# Patient Record
Sex: FEMALE | Race: OTHER
[De-identification: names, ages, dates, MRNs, and addresses within clinical notes are randomized per-mention and may not be internally consistent; named-entity substitution may affect disease eponyms.]

---

## 2019-07-29 ENCOUNTER — HOSPITAL ENCOUNTER (INPATIENT)
Dept: HOSPITAL 54 - MED | Age: 44
LOS: 3 days | Discharge: HOME | DRG: 710 | End: 2019-08-01
Attending: NURSE PRACTITIONER | Admitting: NURSE PRACTITIONER
Payer: MEDICAID

## 2019-07-29 VITALS — DIASTOLIC BLOOD PRESSURE: 73 MMHG | SYSTOLIC BLOOD PRESSURE: 126 MMHG

## 2019-07-29 VITALS — DIASTOLIC BLOOD PRESSURE: 69 MMHG | SYSTOLIC BLOOD PRESSURE: 114 MMHG

## 2019-07-29 VITALS — BODY MASS INDEX: 32.94 KG/M2 | HEIGHT: 62 IN | WEIGHT: 179 LBS

## 2019-07-29 VITALS — SYSTOLIC BLOOD PRESSURE: 110 MMHG | DIASTOLIC BLOOD PRESSURE: 69 MMHG

## 2019-07-29 DIAGNOSIS — K80.42: ICD-10-CM

## 2019-07-29 DIAGNOSIS — A41.9: Primary | ICD-10-CM

## 2019-07-29 DIAGNOSIS — E80.6: ICD-10-CM

## 2019-07-29 DIAGNOSIS — K21.9: ICD-10-CM

## 2019-07-29 DIAGNOSIS — Z98.890: ICD-10-CM

## 2019-07-29 DIAGNOSIS — Z79.899: ICD-10-CM

## 2019-07-29 DIAGNOSIS — E66.9: ICD-10-CM

## 2019-07-29 DIAGNOSIS — E83.42: ICD-10-CM

## 2019-07-29 LAB
ALBUMIN SERPL BCP-MCNC: 3.2 G/DL (ref 3.4–5)
ALP SERPL-CCNC: 99 U/L (ref 46–116)
ALT SERPL W P-5'-P-CCNC: 28 U/L (ref 12–78)
APPEARANCE UR: CLEAR
AST SERPL W P-5'-P-CCNC: 14 U/L (ref 15–37)
BASOPHILS # BLD AUTO: 0 /CMM (ref 0–0.2)
BASOPHILS NFR BLD AUTO: 0.1 % (ref 0–2)
BILIRUB SERPL-MCNC: 1.1 MG/DL (ref 0.2–1)
BILIRUB UR QL STRIP: (no result)
BUN SERPL-MCNC: 10 MG/DL (ref 7–18)
CALCIUM SERPL-MCNC: 8.6 MG/DL (ref 8.5–10.1)
CHLORIDE SERPL-SCNC: 103 MMOL/L (ref 98–107)
CO2 SERPL-SCNC: 24 MMOL/L (ref 21–32)
COLOR UR: (no result)
CREAT SERPL-MCNC: 0.9 MG/DL (ref 0.6–1.3)
EOSINOPHIL NFR BLD AUTO: 1.1 % (ref 0–6)
GLUCOSE SERPL-MCNC: 98 MG/DL (ref 74–106)
GLUCOSE UR STRIP-MCNC: NEGATIVE MG/DL
HCT VFR BLD AUTO: 38 % (ref 33–45)
HGB BLD-MCNC: 12.6 G/DL (ref 11.5–14.8)
HGB UR QL STRIP: (no result) ERY/UL
KETONES UR STRIP-MCNC: (no result) MG/DL
LEUKOCYTE ESTERASE UR QL STRIP: NEGATIVE
LYMPHOCYTES NFR BLD AUTO: 1.2 /CMM (ref 0.8–4.8)
LYMPHOCYTES NFR BLD AUTO: 8 % (ref 20–44)
MCHC RBC AUTO-ENTMCNC: 34 G/DL (ref 31–36)
MCV RBC AUTO: 86 FL (ref 82–100)
MONOCYTES NFR BLD AUTO: 1.2 /CMM (ref 0.1–1.3)
MONOCYTES NFR BLD AUTO: 8.3 % (ref 2–12)
NEUTROPHILS # BLD AUTO: 12.2 /CMM (ref 1.8–8.9)
NEUTROPHILS NFR BLD AUTO: 82.5 % (ref 43–81)
NITRITE UR QL STRIP: NEGATIVE
PH UR STRIP: 7 [PH] (ref 5–8)
PLATELET # BLD AUTO: 253 /CMM (ref 150–450)
POTASSIUM SERPL-SCNC: 3.6 MMOL/L (ref 3.5–5.1)
PROT SERPL-MCNC: 7.3 G/DL (ref 6.4–8.2)
PROT UR QL STRIP: (no result) MG/DL
RBC # BLD AUTO: 4.36 MIL/UL (ref 4–5.2)
RBC #/AREA URNS HPF: (no result) /HPF (ref 0–2)
SODIUM SERPL-SCNC: 138 MMOL/L (ref 136–145)
UROBILINOGEN UR STRIP-MCNC: 2 EU/DL
WBC #/AREA URNS HPF: (no result) /HPF (ref 0–3)
WBC NRBC COR # BLD AUTO: 14.8 K/UL (ref 4.3–11)

## 2019-07-29 PROCEDURE — A9537 TC99M MEBROFENIN: HCPCS

## 2019-07-29 PROCEDURE — G0378 HOSPITAL OBSERVATION PER HR: HCPCS

## 2019-07-29 RX ADMIN — SODIUM CHLORIDE PRN MLS/HR: 9 INJECTION, SOLUTION INTRAVENOUS at 13:27

## 2019-07-29 RX ADMIN — DEXTROSE MONOHYDRATE SCH MLS/HR: 50 INJECTION, SOLUTION INTRAVENOUS at 13:55

## 2019-07-29 RX ADMIN — FAMOTIDINE SCH MG: 20 TABLET, FILM COATED ORAL at 23:12

## 2019-07-29 NOTE — NUR
RN NOTES



ADMINISTERED PEPCID [id number] AT THIS TIME DUE TO PATIENT BEING NPO STATUS FOR REPEAT HIDA SCAN. 
WILL CONTINUE TO MONITOR

## 2019-07-29 NOTE — NUR
MS RN NOTE



PATIENT RETURNED FROM HIDA SCAN. ACCORDING TO EZ FROM [facility], PATIENT IS NOT COMPLETE WITH 
HIDA SCAN. PATIENT NEEDS TO RETURN AND COMPLETE HIDA SCAN DUE TO GALLBLADDER NOT SHOWING AT 
THIS TIME, ACCORDING TO EZ. PATIENT TO COMPLETE HIDA SCAN AT 2130. WILL ENDORSE TO PM 
SHIFT. CONSENT IS SIGNED AND IN CHART.

## 2019-07-29 NOTE — NUR
MS RN CLOSING NOTE



PATIENT IN ROOM IN CHAIR, RESTING COMFORTABLY. PATIENT IN NO ACUTE DISTRESS. NO SOB NOTED. 
PATIENT BREATHING IS EVEN AND UNLABORED. PATIENT WAS KEPT CLEAN AND DRY THROUGHOUT SHIFT. 
PATIENT IV INTACT. ALL NURSING NEEDS MET. ENDORSED TO PM SHIFT THAT PATIENT IS TO BE KEPT 
NPO AFTER MIDNIGHT FOR MRCP IN THE AM. PATIENT BED IS LOCKED AND IN LOWEST POSITION. CALL 
LIGHT WITHIN REACH. SAFETY PRECAUTIONS IN PLACE. ENDORSED CARE TO PM SHIFT FOR PIERRE.

## 2019-07-29 NOTE — NUR
RN OPEN NOTES



RECEIVED PATIENT AWAKE SITTING IN CHAIR. A/OX4. NO SIGNS OF DISTRESS OR DISCOMFORT. 
BREATHING EVEN AND UNLABORED. PT STATES PAIN IS 4/10 IN RUQ AND TOLERABLE AT THIS TIME. IV 
ACCESS IN LAC WITH NS INFUSING, PATENT AND INTACT, NO SIGNS OF REDNESS OR INFILTRATION. PT 
CURRENTLY NPO FOR F/U HIDA SCAN TO BE DONE AT 2130 PER AM SHIFT. BED IN LOW LOCKED POSITION 
WITH SIDE RAILS X2. CALL LIGHT WITHIN REACH. WILL CONTINUE TO MONITOR.

## 2019-07-29 NOTE — NUR
RN NOTES



PATIENT PRESENTS WITH SHIVERING AND CHILLS, STATES SHE FEELS VERY COLD. VITAL SIGNS STABLE. 
TEMP 99.1. PATIENT GIVEN WARM BLANKETS. WILL CONTINUE TO MONITOR.

## 2019-07-29 NOTE — NUR
MS RN NOTE



CONTACTED [facility] MACIAS AGAIN FOR FOLLOW UP ADMISSION ORDERS. STILL AWAITING ORDERS. PATIENT 
IN NO ACUTE DISTRESS. WILL CONTINUE TO MONITOR.

## 2019-07-29 NOTE — NUR
MS RN ADMISSION NOTE



PATIENT RECEIVED FROM [facility] AS A DIRECT ADMIT. PATIENT ARRIVED BY GURNEY BUT 
AMBULATORY TO BED. PATIENT VITAL SIGNS WNL. PATIENT IN NO ACUTE DISTRESS. NO SOB NOTED. 
PATIENT BREATHING IS EVEN AND UNLABORED. SON IS AT THE BEDSIDE. PATIENT STATES NO PAIN AT 
THIS TIME UNLESS SHE AGGRESSIVELY AMBULATES. THEN HER ABDOMEN ACHES AT 4/10 PAIN. PATIENT 
ABLE TO VERBALIZE CONCERNS. CONCERNS AND NEEDS ADDRESSED AT THIS TIME. PATIENT BED IS LOCKED 
AND IN LOWEST POSITION. CALL LIGHT WITHIN REACH. WILL CONTINUE TO MONITOR. MD AWARE OF 
PATIENTS ARRIVAL.

## 2019-07-30 VITALS — SYSTOLIC BLOOD PRESSURE: 91 MMHG | DIASTOLIC BLOOD PRESSURE: 60 MMHG

## 2019-07-30 VITALS — DIASTOLIC BLOOD PRESSURE: 60 MMHG | SYSTOLIC BLOOD PRESSURE: 91 MMHG

## 2019-07-30 VITALS — DIASTOLIC BLOOD PRESSURE: 52 MMHG | SYSTOLIC BLOOD PRESSURE: 89 MMHG

## 2019-07-30 LAB
ALBUMIN SERPL BCP-MCNC: 3.2 G/DL (ref 3.4–5)
ALP SERPL-CCNC: 170 U/L (ref 46–116)
ALT SERPL W P-5'-P-CCNC: 32 U/L (ref 12–78)
AST SERPL W P-5'-P-CCNC: 37 U/L (ref 15–37)
BASOPHILS # BLD AUTO: 0 /CMM (ref 0–0.2)
BASOPHILS NFR BLD AUTO: 0.3 % (ref 0–2)
BILIRUB DIRECT SERPL-MCNC: 0.5 MG/DL (ref 0–0.2)
BILIRUB SERPL-MCNC: 1.4 MG/DL (ref 0.2–1)
BUN SERPL-MCNC: 17 MG/DL (ref 7–18)
CALCIUM SERPL-MCNC: 9.2 MG/DL (ref 8.5–10.1)
CHLORIDE SERPL-SCNC: 102 MMOL/L (ref 98–107)
CHOLEST SERPL-MCNC: 151 MG/DL (ref ?–200)
CO2 SERPL-SCNC: 19 MMOL/L (ref 21–32)
CREAT SERPL-MCNC: 1.2 MG/DL (ref 0.6–1.3)
EOSINOPHIL NFR BLD AUTO: 0.4 % (ref 0–6)
EOSINOPHIL NFR BLD MANUAL: 1 % (ref 0–4)
GLUCOSE SERPL-MCNC: 116 MG/DL (ref 74–106)
HCT VFR BLD AUTO: 39 % (ref 33–45)
HDLC SERPL-MCNC: 53 MG/DL (ref 40–60)
HGB BLD-MCNC: 12.9 G/DL (ref 11.5–14.8)
LDLC SERPL DIRECT ASSAY-MCNC: 88 MG/DL (ref 0–99)
LYMPHOCYTES NFR BLD AUTO: 0.6 /CMM (ref 0.8–4.8)
LYMPHOCYTES NFR BLD AUTO: 22.7 % (ref 20–44)
LYMPHOCYTES NFR BLD MANUAL: 23 % (ref 16–48)
MAGNESIUM SERPL-MCNC: 1.6 MG/DL (ref 1.8–2.4)
MCHC RBC AUTO-ENTMCNC: 33 G/DL (ref 31–36)
MCV RBC AUTO: 88 FL (ref 82–100)
MONOCYTES NFR BLD AUTO: 0 /CMM (ref 0.1–1.3)
MONOCYTES NFR BLD AUTO: 1.8 % (ref 2–12)
MONOCYTES NFR BLD MANUAL: 5 % (ref 0–11)
NEUTROPHILS # BLD AUTO: 2 /CMM (ref 1.8–8.9)
NEUTROPHILS NFR BLD AUTO: 74.8 % (ref 43–81)
NEUTS BAND NFR BLD MANUAL: 17 % (ref 0–5)
NEUTS SEG NFR BLD MANUAL: 54 % (ref 42–76)
PHOSPHATE SERPL-MCNC: 3.7 MG/DL (ref 2.5–4.9)
PLATELET # BLD AUTO: 239 /CMM (ref 150–450)
POTASSIUM SERPL-SCNC: 3.7 MMOL/L (ref 3.5–5.1)
PROT SERPL-MCNC: 7.7 G/DL (ref 6.4–8.2)
RBC # BLD AUTO: 4.43 MIL/UL (ref 4–5.2)
SODIUM SERPL-SCNC: 138 MMOL/L (ref 136–145)
TRIGL SERPL-MCNC: 96 MG/DL (ref 30–150)
TSH SERPL DL<=0.005 MIU/L-ACNC: 1.26 UIU/ML (ref 0.36–3.74)
WBC NRBC COR # BLD AUTO: 2.7 K/UL (ref 4.3–11)

## 2019-07-30 PROCEDURE — 0FB04ZZ EXCISION OF LIVER, PERCUTANEOUS ENDOSCOPIC APPROACH: ICD-10-PCS | Performed by: SURGERY

## 2019-07-30 PROCEDURE — 0FT44ZZ RESECTION OF GALLBLADDER, PERCUTANEOUS ENDOSCOPIC APPROACH: ICD-10-PCS | Performed by: SURGERY

## 2019-07-30 RX ADMIN — FAMOTIDINE SCH MG: 20 TABLET, FILM COATED ORAL at 21:10

## 2019-07-30 RX ADMIN — SODIUM CHLORIDE PRN MLS/HR: 9 INJECTION, SOLUTION INTRAVENOUS at 18:37

## 2019-07-30 RX ADMIN — FAMOTIDINE SCH MG: 20 TABLET, FILM COATED ORAL at 09:59

## 2019-07-30 RX ADMIN — DEXTROSE MONOHYDRATE SCH MLS/HR: 50 INJECTION, SOLUTION INTRAVENOUS at 13:55

## 2019-07-30 RX ADMIN — Medication PRN MG: at 23:37

## 2019-07-30 RX ADMIN — VITAMIN D, TAB 1000IU (100/BT) SCH UNIT: 25 TAB at 09:59

## 2019-07-30 NOTE — NUR
MS RN NOTES 

CLARIFIED WITH [facility] NORWOOD PATIENT TO BE KEPT NPO , CLARIFIED MAGNESIUM OXIDE PO ORDER 
WITH ORDERS TO CHANGE TO MAGNESIUM 1 GRAM IV. NOTED AND CARRIED OUT.

## 2019-07-30 NOTE — NUR
TELE RN NOTES

PATIENT CAME BACK FROM OPERATING ROOM WITH STABLE VITAL SIGNS. NO ACUTE DISTRESS NOTED. 
PLACED TELEMETRY SINUS RHYTHM. PATIENT WITH HUMBERTO DRAIN ON RIGHT ABDOMEN WITH 30CC 
SEROSANGUINEOUS OUTPUT, WITH DRESSING AROUND, CLEAN DRY AND INTACT. SAFETY MEASURES IN 
PLACE. CALL LIGHT WITH REACH. WILL CONTINUE TO MONITOR ACCORDINGLY.

## 2019-07-30 NOTE — NUR
TELE RN NOTES

PATIENT WITH STABLE VITAL SIGNS, NO ACUTE DISTRESS NOTED, BREATHING UNLABORED. WILL CONTINUE 
TO MONITOR ACCORDINGLY.

## 2019-07-30 NOTE — NUR
RN CLOSING NOTES



PATIENT AWAKE SITTING IN CHAIR. A/OX4. NO SIGNS OF DISTRESS OR DISCOMFORT. BREATHING EVEN 
AND UNLABORED. DENIES ANY PAIN AT THIS TIME. IV ACCESS IN LAC, PATENT AND INTACT, NO SIGNS 
OF REDNESS OR INFILTRATION. PT DOES NOT WANT TO BE CONNECTED TO HER IVF'S A THIS TIME, PT 
STATES SHE BELIEVES THAT IS WHAT'S MAKING HER HAVE CHILLS. PATIENT ADVISED OF RISK AND 
BENEFITS. BED IN LOW LOCKED POSITION WITH SIDE RAILS X2. CALL LIGHT WITHIN REACH. WILL 
ENDORSE TO AM SHIFT FOR PIERRE.

## 2019-07-30 NOTE — NUR
MS RN NOTES

PATIENT IN BED ALERT ORIENTED X 4. NO ACUTE DISTRESS NOTED. BREATHING UNLABORED. IV ACCESS 
PATENT AND INTACT, NO REDNESS OR SWELLING NOTED. SAFETY MEASURES IN PLACE. CALL LIGHT WITHIN 
REACH. WILL CONTINUE TO MONITOR ACCORDINGLY.

## 2019-07-30 NOTE — NUR
tele lvn notes c/o pain

c/o abdominal pain, no n/v noted. Morphine 2 mg IVP given by nurse Jose R/RN marlene IVP as 
ordered. will continue monitoring.

## 2019-07-30 NOTE — NUR
TELE LVN INITIAL NOTES

 RECEIVED PT IN BED AWAKE AND ALERT WITH IVF ON NS AT 125ML/HR INFUSING ON HER LEFT AC 
PATENT AND INTACT, NO REDNESS NOTED. PT HAD HUMBERTO WITH SMALL AMOUNT OF OUTPUT NOTED , NO 
BLEEDING ON OPERATION SITE OPEN TO AIR. DENIES ANY PAIN OR ANY DISCOMFORT AT THIS TIME. NO 
SIGNS OF ANY DISTRESS NOTED. SHE ALSO ON TELE SINUS RHYTHM PER MONITOR. KEPT HER WARM AND 
COMFORTABLE AT ALL TIMES. I ALSO ENCOURAGE HER USE THE CALL LIGHT SYSTEM IF SHE NEEDS SOME 
HELPED . PLACE CALL LIGHT AT REACH.

## 2019-07-30 NOTE — NUR
MS RN NOTES

PATIENT IN BED ALERT ORIENTED X 4. NO ACUTE DISTRESS NOTED, VITAL SIGNS MONITORED REMAINS 
STABLE. BREATHING UNLABORED. IV ACCESS PATENT AND INTACT, NO REDNESS OR SWELLING NOTED. HUMBERTO 
DRAIN ON RIGHT ABDOMEN INTACT WITH DRESSING CLEAN DRY AND INTACT. SAFETY MEASURES IN PLACE. 
CALL LIGHT WITHIN REACH. WILL ENDORSE TO NIGHT NURSE FOR CONTINUITY OF CARE.

## 2019-07-31 VITALS — SYSTOLIC BLOOD PRESSURE: 104 MMHG | DIASTOLIC BLOOD PRESSURE: 66 MMHG

## 2019-07-31 VITALS — SYSTOLIC BLOOD PRESSURE: 120 MMHG | DIASTOLIC BLOOD PRESSURE: 69 MMHG

## 2019-07-31 VITALS — DIASTOLIC BLOOD PRESSURE: 84 MMHG | SYSTOLIC BLOOD PRESSURE: 135 MMHG

## 2019-07-31 VITALS — DIASTOLIC BLOOD PRESSURE: 63 MMHG | SYSTOLIC BLOOD PRESSURE: 108 MMHG

## 2019-07-31 VITALS — DIASTOLIC BLOOD PRESSURE: 74 MMHG | SYSTOLIC BLOOD PRESSURE: 103 MMHG

## 2019-07-31 LAB
ALBUMIN SERPL BCP-MCNC: 2.7 G/DL (ref 3.4–5)
ALP SERPL-CCNC: 124 U/L (ref 46–116)
ALT SERPL W P-5'-P-CCNC: 60 U/L (ref 12–78)
AST SERPL W P-5'-P-CCNC: 57 U/L (ref 15–37)
BASOPHILS # BLD AUTO: 0 /CMM (ref 0–0.2)
BASOPHILS NFR BLD AUTO: 0 % (ref 0–2)
BILIRUB DIRECT SERPL-MCNC: 0.2 MG/DL (ref 0–0.2)
BILIRUB SERPL-MCNC: 0.5 MG/DL (ref 0.2–1)
BUN SERPL-MCNC: 26 MG/DL (ref 7–18)
CALCIUM SERPL-MCNC: 8.6 MG/DL (ref 8.5–10.1)
CHLORIDE SERPL-SCNC: 103 MMOL/L (ref 98–107)
CO2 SERPL-SCNC: 22 MMOL/L (ref 21–32)
CREAT SERPL-MCNC: 1 MG/DL (ref 0.6–1.3)
EOSINOPHIL NFR BLD AUTO: 0 % (ref 0–6)
GLUCOSE SERPL-MCNC: 173 MG/DL (ref 74–106)
HCT VFR BLD AUTO: 35 % (ref 33–45)
HGB BLD-MCNC: 11.4 G/DL (ref 11.5–14.8)
LYMPHOCYTES NFR BLD AUTO: 1.3 /CMM (ref 0.8–4.8)
LYMPHOCYTES NFR BLD AUTO: 9 % (ref 20–44)
MCHC RBC AUTO-ENTMCNC: 33 G/DL (ref 31–36)
MCV RBC AUTO: 87 FL (ref 82–100)
MONOCYTES NFR BLD AUTO: 0.4 /CMM (ref 0.1–1.3)
MONOCYTES NFR BLD AUTO: 2.8 % (ref 2–12)
NEUTROPHILS # BLD AUTO: 12.6 /CMM (ref 1.8–8.9)
NEUTROPHILS NFR BLD AUTO: 88.2 % (ref 43–81)
PLATELET # BLD AUTO: 247 /CMM (ref 150–450)
POTASSIUM SERPL-SCNC: 4.2 MMOL/L (ref 3.5–5.1)
PROT SERPL-MCNC: 7.1 G/DL (ref 6.4–8.2)
RBC # BLD AUTO: 4.03 MIL/UL (ref 4–5.2)
SODIUM SERPL-SCNC: 136 MMOL/L (ref 136–145)
WBC NRBC COR # BLD AUTO: 14.3 K/UL (ref 4.3–11)

## 2019-07-31 RX ADMIN — SODIUM CHLORIDE PRN MLS/HR: 9 INJECTION, SOLUTION INTRAVENOUS at 07:04

## 2019-07-31 RX ADMIN — FAMOTIDINE SCH MG: 20 TABLET, FILM COATED ORAL at 08:49

## 2019-07-31 RX ADMIN — FAMOTIDINE SCH MG: 20 TABLET, FILM COATED ORAL at 21:09

## 2019-07-31 RX ADMIN — VITAMIN D, TAB 1000IU (100/BT) SCH UNIT: 25 TAB at 08:49

## 2019-07-31 RX ADMIN — DEXTROSE MONOHYDRATE SCH MLS/HR: 50 INJECTION, SOLUTION INTRAVENOUS at 12:29

## 2019-07-31 NOTE — NUR
RN NOTES



RECEIVED PATIENT AWAKE LYING IN BED, RESTING COMFORTABLY, NO SIGNS OF ACUTE DISTRESS NOTED, 
NO FACIAL GRIMACING, DISCOMFORT TOLERABLE AS VERBALIZED BY PATIENT, BREATHING EVEN AND NON 
LABORED, CALL LIGHT WITHIN EASY REACH, IV ACCESS ON HER LFA G#24 WITH NS AT 75 ML/HR 
INFUSING WELL, ALL NEEDS ATTENDED, WILL CONTINUE TO MONITOR ACCORDINGLY.

## 2019-07-31 NOTE — NUR
lvn notes

 pt woke up and states that she feel bloated and started getting pain, i spoke to her maybe 
your having gas pain because your not moving at all i asked her if she wants to used the 
restroom that will help her to pass gas. explained to her the importance of ambulating as 
possible if she can and she understood well. assisted her to the bathroom while Shayna Joslyn 
changing her beddings. the patient stated she feel much better after she used the restroom , 
passed gas and urinated well at the same time. HUMBERTO have small amount of output noted. she 's 
saying she wants to sit in the chair for a while. call light within reach. will continue 
monitoring.

## 2019-07-31 NOTE — NUR
RN OPENING NOTES

PT RESTING IN BED. PATIENT PRIMARILY Turkish SPEAKER. NO APPARENT S/S OF PAIN, DISTRESS OR 
SOB AT THIS TIME. PT S/P LAP VESTA 7/30/19. PT HAS LEFT AC #20 INTACT AND PATIENT RUNNING NS 
@75 ML/HR. SAFETY PRECAUTIONS IN PLACE, BED IN LOWEST LOCKED POSITION, X2 SIDE RAILS UP AND 
CALL LIGHT WITHIN REACH. WILL CONTINUE TO MONITOR. 

-------------------------------------------------------------------------------

Addendum: 07/31/19 at 0802 by GISSELLE RODRÍGUEZ RN

-------------------------------------------------------------------------------

TELE MONITORED NS 62.

## 2019-07-31 NOTE — NUR
tele lvn closing notes

 pt back to sleep after morning care done. DVT pump still on, IVf still infusing. all due 
meds given and all needs met. stable marlene the night . HUMBERTO draining 20ml . kept her warm and 
comfortable at all times. no signs fo any distress or any discomfort noted at this time. 
will endorse to am nurse for continuity of care. place call light at reach.

## 2019-07-31 NOTE — NUR
RN OPENING NOTES

PT RESTING IN BED. PATIENT PRIMARILY [place] SPEAKER. NO APPARENT S/S OF PAIN, DISTRESS OR 
SOB DURING SHIFT. PT S/P LAP VESTA 7/30/19. HUMBERTO DRAIN IN PLACE, OUTPUT 25ML. PT HAS LEFT 
FOREARM #24 INTACT AND PATIENT RUNNING NS @75 ML/HR. SAFETY PRECAUTIONS IN PLACE, BED IN 
LOWEST LOCKED POSITION, X2 SIDE RAILS UP AND CALL LIGHT WITHIN REACH. WILL ENDORSE TO NIGHT 
SHIFT NURSE FOR CONTINUITY OF CARE. 

-------------------------------------------------------------------------------

Addendum: 07/31/19 at 1818 by GISSELLE RODRÍGUEZ RN

-------------------------------------------------------------------------------

RN CLOSING NOTES

## 2019-07-31 NOTE — NUR
lvn notes

 pt resting comfortably in bed without any discomfort noted. ivf still infusing. kept her 
warm and comfortable at all times. tele SR per monitor.

## 2019-08-01 VITALS — DIASTOLIC BLOOD PRESSURE: 76 MMHG | SYSTOLIC BLOOD PRESSURE: 120 MMHG

## 2019-08-01 LAB
BASOPHILS # BLD AUTO: 0 /CMM (ref 0–0.2)
BASOPHILS NFR BLD AUTO: 0.1 % (ref 0–2)
BUN SERPL-MCNC: 20 MG/DL (ref 7–18)
CALCIUM SERPL-MCNC: 8.3 MG/DL (ref 8.5–10.1)
CHLORIDE SERPL-SCNC: 108 MMOL/L (ref 98–107)
CO2 SERPL-SCNC: 23 MMOL/L (ref 21–32)
CREAT SERPL-MCNC: 0.9 MG/DL (ref 0.6–1.3)
EOSINOPHIL NFR BLD AUTO: 0 % (ref 0–6)
GLUCOSE SERPL-MCNC: 143 MG/DL (ref 74–106)
HCT VFR BLD AUTO: 32 % (ref 33–45)
HGB BLD-MCNC: 11 G/DL (ref 11.5–14.8)
LYMPHOCYTES NFR BLD AUTO: 1.9 /CMM (ref 0.8–4.8)
LYMPHOCYTES NFR BLD AUTO: 17.2 % (ref 20–44)
MCHC RBC AUTO-ENTMCNC: 34 G/DL (ref 31–36)
MCV RBC AUTO: 85 FL (ref 82–100)
MONOCYTES NFR BLD AUTO: 0.7 /CMM (ref 0.1–1.3)
MONOCYTES NFR BLD AUTO: 6.9 % (ref 2–12)
NEUTROPHILS # BLD AUTO: 8.2 /CMM (ref 1.8–8.9)
NEUTROPHILS NFR BLD AUTO: 75.8 % (ref 43–81)
PLATELET # BLD AUTO: 246 /CMM (ref 150–450)
POTASSIUM SERPL-SCNC: 3.9 MMOL/L (ref 3.5–5.1)
RBC # BLD AUTO: 3.79 MIL/UL (ref 4–5.2)
SODIUM SERPL-SCNC: 141 MMOL/L (ref 136–145)
WBC NRBC COR # BLD AUTO: 10.9 K/UL (ref 4.3–11)

## 2019-08-01 RX ADMIN — DEXTROSE MONOHYDRATE SCH MLS/HR: 50 INJECTION, SOLUTION INTRAVENOUS at 12:05

## 2019-08-01 RX ADMIN — Medication PRN MG: at 12:41

## 2019-08-01 RX ADMIN — VITAMIN D, TAB 1000IU (100/BT) SCH UNIT: 25 TAB at 08:56

## 2019-08-01 RX ADMIN — FAMOTIDINE SCH MG: 20 TABLET, FILM COATED ORAL at 08:56

## 2019-08-01 NOTE — NUR
RN DISCHARGE NOTES

PT STABLE AT DISCHARGE. HUMBERTO DRAIN REMOVED, AND NEW DRESSING SECURED BY NP. ALL DISCHARGE 
PAPERWORK, DISCUSSED, SIGNED, COPIED, AND GIVEN TO THE PATIENT. ID AND IV REMOVED. ALL 
PATIENT'S BELONGINGS TAKEN WITH PATIENT. PT GIVEN SPECIFIC INFORMATION ABOUT DIET S/P LAP 
VESTA. PT LEFT UNIT VIA WHEELCHAIR AND ACCOMPANIED BY 2 SONS. PT WILL HAVE PRIVATE AUTO 
TRANSPORT.

## 2019-08-01 NOTE — NUR
RN NOTES



ALL NEEDS ATTENDED AND MET, ABLE TO REST AND SLEPT AT INTERVALS, ENCOURAGED TO AMBULATE 
SINCE LAST NIGHT, PATIENT ABLE TO WALK, STAND AND AMBULATES IN THE ROOM AND IN THE HALLWAY, 
HUMBERTO OUTPUT 25CC PINKISH RED, DENIES ANY PAIN AT THIS TIME, SAFETY MEASURES IN PLACE, CALL 
LIGHT WITHIN EASY REACH, WILL ENDORSE TO AM NURSE FOR CONTINUITY OF CARE.

## 2019-08-01 NOTE — NUR
RN OPENING NOTES

PT RESTING IN BED. PATIENT PRIMARILY [employer] SPEAKER. NO APPARENT S/S OF PAIN, DISTRESS OR 
SOB AT THIS TIME. PT S/P LAP VESTA 7/30/19. PT HAS LEFT AC #20 INTACT AND PATIENT RUNNING NS 
@75 ML/HR. HUMBERTO DRAIN INTACT. SAFETY PRECAUTIONS IN PLACE, BED IN LOWEST LOCKED POSITION, X2 
SIDE RAILS UP AND CALL LIGHT WITHIN REACH. WILL CONTINUE TO MONITOR.